# Patient Record
Sex: MALE | Race: WHITE | NOT HISPANIC OR LATINO | Employment: FULL TIME | URBAN - NONMETROPOLITAN AREA
[De-identification: names, ages, dates, MRNs, and addresses within clinical notes are randomized per-mention and may not be internally consistent; named-entity substitution may affect disease eponyms.]

---

## 2017-01-12 ENCOUNTER — OFFICE VISIT (OUTPATIENT)
Dept: UROLOGY | Facility: CLINIC | Age: 52
End: 2017-01-12

## 2017-01-12 VITALS
RESPIRATION RATE: 16 BRPM | WEIGHT: 180 LBS | SYSTOLIC BLOOD PRESSURE: 133 MMHG | OXYGEN SATURATION: 98 % | HEART RATE: 84 BPM | HEIGHT: 69 IN | DIASTOLIC BLOOD PRESSURE: 89 MMHG | BODY MASS INDEX: 26.66 KG/M2

## 2017-01-12 DIAGNOSIS — R35.1 NOCTURIA: Primary | ICD-10-CM

## 2017-01-12 DIAGNOSIS — E29.1 HYPOGONADISM MALE: ICD-10-CM

## 2017-01-12 DIAGNOSIS — N52.9 ERECTILE DYSFUNCTION, UNSPECIFIED ERECTILE DYSFUNCTION TYPE: ICD-10-CM

## 2017-01-12 DIAGNOSIS — N40.1 BPH (BENIGN PROSTATIC HYPERTROPHY) WITH URINARY OBSTRUCTION: ICD-10-CM

## 2017-01-12 DIAGNOSIS — R79.89 LOW TESTOSTERONE: Primary | ICD-10-CM

## 2017-01-12 DIAGNOSIS — N13.8 BPH (BENIGN PROSTATIC HYPERTROPHY) WITH URINARY OBSTRUCTION: ICD-10-CM

## 2017-01-12 DIAGNOSIS — N41.1 CHRONIC PROSTATITIS: ICD-10-CM

## 2017-01-12 LAB
BILIRUB BLD-MCNC: NEGATIVE MG/DL
CLARITY, POC: CLEAR
COLOR UR: YELLOW
GLUCOSE UR STRIP-MCNC: NEGATIVE MG/DL
KETONES UR QL: NEGATIVE
LEUKOCYTE EST, POC: NEGATIVE
LH SERPL-ACNC: 4.4 MIU/ML
NITRITE UR-MCNC: NEGATIVE MG/ML
PH UR: 6 [PH] (ref 5–8)
PROLACTIN SERPL-MCNC: 7.6 NG/ML
PROT UR STRIP-MCNC: NEGATIVE MG/DL
RBC # UR STRIP: NEGATIVE /UL
SP GR UR: 1.01 (ref 1–1.03)
UROBILINOGEN UR QL: NORMAL

## 2017-01-12 PROCEDURE — 83002 ASSAY OF GONADOTROPIN (LH): CPT | Performed by: UROLOGY

## 2017-01-12 PROCEDURE — 99204 OFFICE O/P NEW MOD 45 MIN: CPT | Performed by: UROLOGY

## 2017-01-12 PROCEDURE — 76857 US EXAM PELVIC LIMITED: CPT | Performed by: UROLOGY

## 2017-01-12 PROCEDURE — 84403 ASSAY OF TOTAL TESTOSTERONE: CPT | Performed by: UROLOGY

## 2017-01-12 PROCEDURE — 81003 URINALYSIS AUTO W/O SCOPE: CPT | Performed by: UROLOGY

## 2017-01-12 PROCEDURE — 84402 ASSAY OF FREE TESTOSTERONE: CPT | Performed by: UROLOGY

## 2017-01-12 PROCEDURE — 84146 ASSAY OF PROLACTIN: CPT | Performed by: UROLOGY

## 2017-01-12 PROCEDURE — 36415 COLL VENOUS BLD VENIPUNCTURE: CPT | Performed by: UROLOGY

## 2017-01-12 NOTE — PROGRESS NOTES
[facility]- UROLOGY  [address], [address]  [place], [place] [address]  [phone]      01/12/2017    Zachariahkayleen Dowd  1965        Pelvic Ultrasound with PVR    A transabdominal pelvic ultrasound was performed using a 3.5 MHz transducer of a B-K Gonsalez through the suprapubic area.     The purpose of the study was to investigate the patient's voiding symptoms.  There was mild bladder wall thickening noted.  There were no intravesical filling defects seen.  The post void residual of 46.9 ml was noted.  Prostate was heterogeneous with calcifications and was noted to be only 25.7 grams.  There was no  protrusion of the prostate into the bladder.  Ultrasound images will be scanned into the chart for reference.       CPT code 32593        Performed by Des Mckeon MD

## 2017-01-12 NOTE — MR AVS SNAPSHOT
Zachariah Dowd   1/12/2017 9:30 AM   Office Visit    Dept Phone:  [phone]   Encounter #:  [id number]    Provider:  Des Mckeon MD   Department:  [facility] UROLOGY                Your Full Care Plan              Your Updated Medication List          This list is accurate as of: 1/12/17 10:22 AM.  Always use your most recent med list.                doxycycline 100 MG tablet   Commonly known as:  VIBRAMYICN   Take 1 tablet by mouth 2 (Two) Times a Day.       famotidine 20 MG tablet   Commonly known as:  PEPCID   Take 1 tablet by mouth 2 (Two) Times a Day.       gabapentin 600 MG tablet   Commonly known as:  NEURONTIN       HYDROcodone-acetaminophen  MG per tablet   Commonly known as:  NORCO       lisinopril 10 MG tablet   Commonly known as:  PRINIVIL,ZESTRIL   Take 1 tablet by mouth Daily.       metroNIDAZOLE 500 MG tablet   Commonly known as:  FLAGYL   Take 1 tablet by mouth 3 (Three) Times a Day.       omeprazole 40 MG capsule   Commonly known as:  priLOSEC   Take 1 capsule by mouth Daily.       ondansetron 4 MG tablet   Commonly known as:  ZOFRAN   Take 1 tablet by mouth Every 6 (Six) Hours As Needed for nausea or vomiting.       sucralfate 1 G tablet   Commonly known as:  CARAFATE   Take 1 tablet by mouth 4 (Four) Times a Day.       tamsulosin 0.4 MG capsule 24 hr capsule   Commonly known as:  FLOMAX   Take 1 capsule by mouth Every Night.       tiZANidine 4 MG tablet   Commonly known as:  ZANAFLEX       traMADol 50 MG tablet   Commonly known as:  ULTRAM               We Performed the Following     POC Urinalysis Dipstick, Automated       You Were Diagnosed With        Codes Comments    Nocturia    -  Primary ICD-10-CM: R35.1  ICD-9-CM: 788.43       Instructions     None    Patient Instructions History      Upcoming Appointments     Visit Type Date Time Department    NEW PATIENT 1/12/2017  9:30 AM [facility] UROLOGY [place]    FOLLOW UP 2/21/2017 10:00 AM  "[facility] UROLOGY [place]      [facility] Signup     [facility] [employer] allows you to send messages to your doctor, view your test results, renew your prescriptions, schedule appointments, and more. To sign up, go to [employer] and click on the Sign Up Now link in the New User? box. Enter your [employer] Activation Code exactly as it appears below along with the last four digits of your Social Security Number and your Date of Birth () to complete the sign-up process. If you do not sign up before the expiration date, you must request a new code.    [employer] Activation Code: [email or web address]  Expires: 2017 10:22 AM    If you have questions, you can email [email or web address] or call [phone] to talk to our [employer] staff. Remember, [employer] is NOT to be used for urgent needs. For medical emergencies, dial 911.               Other Info from Your Visit           Your Appointments     2017 10:00 AM EST   Follow Up with Des Mckeon MD   [facility] (--)    [address] [address]   [phone]           Arrive 15 minutes prior to appointment.              Allergies     No Known Allergies      Reason for Visit     Benign Prostatic Hypertrophy  REF PATIENT FOR NOCTURIA AND BPH.    Nocturia           Vital Signs     Blood Pressure Pulse Respirations Height Weight Oxygen Saturation    133/89 84 16 69\" (175.3 cm) 180 lb (81.6 kg) 98%    Body Mass Index Smoking Status                26.58 kg/m2 Former Smoker          Problems and Diagnoses Noted     Urination, excessive at night    -  Primary      Results     POC Urinalysis Dipstick, Automated      Component Value Standard Range & Units    Color Yellow Yellow, Straw, Dark Yellow, Sherice    Clarity, UA Clear Clear    Glucose, UA Negative Negative, 1000 mg/dL (3+) mg/dL    Bilirubin Negative Negative    Ketones, UA Negative Negative    Specific Gravity  1.015 1.005 - 1.030    Blood, " UA Negative Negative    pH, Urine 6.0 5.0 - 8.0    Protein, POC Negative Negative mg/dL    Urobilinogen, UA Normal Normal    Leukocytes Negative Negative    Nitrite, UA Negative Negative

## 2017-01-12 NOTE — PROGRESS NOTES
Chief Complaint  Benign Prostatic Hypertrophy ( REF PATIENT FOR NOCTURIA AND BPH.) and Nocturia        HPI  Noemí is a 51 y.o. male who is referred for evaluation of his voiding difficulties that he states have been present since grade school.  He has always seemed to have a small capacity overactive bladder with frequency and urgency since that time.  He's had periods when he had large amounts of caffeinated beverages (as many as 8 Cokes per day) but these have been discontinued after his stomach problems.  He continues to drink four large glasses of caffeinated iced tea per day.he was recently started on Flomax and has not been able to tell any difference in his voiding.  He continues to have nocturia ×2-3 although this is associated with his caffeine ingestion and snoring which could indicate sleep apnea.    Perhaps a separate issue are symptoms of hypogonadism with decreased strength stamina libido and erectile function that are probably related to his ingestion of Lortab that he takes for pain from rheumatoid arthritis.    Vitals:    01/12/17 0941   BP: 133/89   Pulse: 84   Resp: 16   SpO2: 98%       Past Medical History  Past Medical History   Diagnosis Date   • Arthritis        Past Surgical History  Past Surgical History   Procedure Laterality Date   • Shoulder surgery         Medications  has a current medication list which includes the following prescription(s): doxycycline, famotidine, gabapentin, hydrocodone-acetaminophen, lisinopril, metronidazole, omeprazole, ondansetron, sucralfate, tamsulosin, tizanidine, and tramadol.      Allergies  No Known Allergies    Social History  Social History     Social History Narrative       Family History  He has no family history of bladder or kidney cancer  He has no family history of kidney stones      AUA Symptom Score:      Review of Systems  Review of Systems  Positive for fatigue frequency urgency back pain joint pain joint swelling but negative in all  other categories.  Physical Exam  Physical Exam   Constitutional: He is oriented to person, place, and time. He appears well-developed and well-nourished.   HENT:   Head: Normocephalic and atraumatic.   Neck: Normal range of motion.   Cardiovascular: Normal rate.    Pulmonary/Chest: Effort normal. No respiratory distress.   Abdominal: Soft. He exhibits no distension and no mass. There is no tenderness. There is no rebound and no guarding. No hernia. Hernia confirmed negative in the right inguinal area and confirmed negative in the left inguinal area.   Genitourinary: Rectum normal, prostate normal, testes normal and penis normal.   Musculoskeletal: Normal range of motion.   Lymphadenopathy:     He has no cervical adenopathy. No inguinal adenopathy noted on the right or left side.   Neurological: He is alert and oriented to person, place, and time.   Skin: Skin is warm and dry.   Psychiatric: He has a normal mood and affect. His behavior is normal.   Vitals reviewed.      Labs Recent and today in the office:  Results for orders placed or performed in visit on 01/12/17   POC Urinalysis Dipstick, Automated   Result Value Ref Range    Color Yellow Yellow, Straw, Dark Yellow, Sherice    Clarity, UA Clear Clear    Glucose, UA Negative Negative, 1000 mg/dL (3+) mg/dL    Bilirubin Negative Negative    Ketones, UA Negative Negative    Specific Gravity  1.015 1.005 - 1.030    Blood, UA Negative Negative    pH, Urine 6.0 5.0 - 8.0    Protein, POC Negative Negative mg/dL    Urobilinogen, UA Normal Normal    Leukocytes Negative Negative    Nitrite, UA Negative Negative         Assessment & Plan  I have reviewed his CT scan which reveals a benign appearing cyst in the right kidney8 thickened bladder wall and an enlarged prostate with a calcium deposit.    Pelvic ultrasound today reveals minimal postvoid residual at least on Flomax and a 25 g prostate with calcifications.    I suggested he discontinue the Flomax for now and we will  need to recheck his postvoid residual without the medication.  He is instructed to get off his caffeine completely and return to consider medication for overactive bladder at that point.    He is also asking for help with hypogonadism so a testosterone level is checked.  The downside of suppressing his own endogenous production is presented along with the risks and benefits.

## 2017-01-17 LAB
TESTOST FREE SERPL-MCNC: 5.5 PG/ML (ref 7.2–24)
TESTOST SERPL-MCNC: 386.1 NG/DL (ref 348–1197)

## 2017-01-18 DIAGNOSIS — R35.1 NOCTURIA: ICD-10-CM

## 2017-01-18 DIAGNOSIS — N40.0 PROSTATE HYPERTROPHY: ICD-10-CM

## 2017-01-18 RX ORDER — TAMSULOSIN HYDROCHLORIDE 0.4 MG/1
1 CAPSULE ORAL NIGHTLY
Qty: 30 CAPSULE | Refills: 0 | Status: SHIPPED | OUTPATIENT
Start: 2017-01-18 | End: 2018-03-16 | Stop reason: ALTCHOICE

## 2017-02-21 ENCOUNTER — OFFICE VISIT (OUTPATIENT)
Dept: UROLOGY | Facility: CLINIC | Age: 52
End: 2017-02-21

## 2017-02-21 VITALS
HEART RATE: 85 BPM | BODY MASS INDEX: 26.66 KG/M2 | SYSTOLIC BLOOD PRESSURE: 134 MMHG | DIASTOLIC BLOOD PRESSURE: 86 MMHG | OXYGEN SATURATION: 98 % | WEIGHT: 180 LBS | TEMPERATURE: 98.7 F | HEIGHT: 69 IN

## 2017-02-21 DIAGNOSIS — N32.81 OVERACTIVE BLADDER: ICD-10-CM

## 2017-02-21 DIAGNOSIS — R35.0 URINARY FREQUENCY: Primary | ICD-10-CM

## 2017-02-21 DIAGNOSIS — N52.9 ERECTILE DYSFUNCTION, UNSPECIFIED ERECTILE DYSFUNCTION TYPE: ICD-10-CM

## 2017-02-21 DIAGNOSIS — E29.1 HYPOGONADISM MALE: ICD-10-CM

## 2017-02-21 LAB
BILIRUB BLD-MCNC: NEGATIVE MG/DL
CLARITY, POC: CLEAR
COLOR UR: YELLOW
GLUCOSE UR STRIP-MCNC: NEGATIVE MG/DL
KETONES UR QL: NEGATIVE
LEUKOCYTE EST, POC: NEGATIVE
NITRITE UR-MCNC: NEGATIVE MG/ML
PH UR: 6 [PH] (ref 5–8)
PROT UR STRIP-MCNC: NEGATIVE MG/DL
RBC # UR STRIP: NEGATIVE /UL
SP GR UR: 1.02 (ref 1–1.03)
UROBILINOGEN UR QL: NORMAL

## 2017-02-21 PROCEDURE — 81003 URINALYSIS AUTO W/O SCOPE: CPT | Performed by: UROLOGY

## 2017-02-21 PROCEDURE — 99214 OFFICE O/P EST MOD 30 MIN: CPT | Performed by: UROLOGY

## 2017-02-21 NOTE — PROGRESS NOTES
Chief Complaint  Urinary Frequency (1 MONTH FUP) and Hypogonadism        HPI  Noemí is a 51 y.o. male who returns today for follow-up of multiple urological concerns.  He seems to have a small capacity overactive bladder that is caused frequency and urgency since grade school.  He originally was consuming large amounts of caffeine and is now down to 2 cups of coffee today with some improvement.  He was taking Flomax on his last visit but with minimal postvoid residual I instructed him to stop it and he states there is been no change in his voiding.  Makes sense since he saw no benefit from starting it.  He's informed about anticholinergic medications for additional relief but declines when he hears about the side effects.  He's instructed about bladder training to increase the bladder capacity.    A second concern or symptoms of hypogonadism with decreased strength stamina and erectile dysfunction and therefore testosterone level was obtained.  His total level was normal but the free level was depressed.  This is doubly associated with a Lortab he takes for chronic pain.  The benefits and risks of testosterone supplement including suppression of endogenous production are reviewed in detail with the patient.  I don't recommend supplement since his symptoms are not that severe but I have encouraged him to minimize the intake of Lortab which is probably causing the problem.    The third concern is erectile dysfunction which is probably more from his stressful lifestyle and atherosclerosis than anything.    Vitals:    02/21/17 1039   BP: 134/86   Pulse: 85   Temp: 98.7 °F (37.1 °C)   SpO2: 98%       Past Medical History  Past Medical History   Diagnosis Date   • Arthritis        Past Surgical History  Past Surgical History   Procedure Laterality Date   • Shoulder surgery         Medications  has a current medication list which includes the following prescription(s): doxycycline, famotidine, gabapentin,  hydrocodone-acetaminophen, lisinopril, metronidazole, omeprazole, ondansetron, sucralfate, tamsulosin, tizanidine, and tramadol.      Allergies  No Known Allergies    Social History  Social History     Social History Narrative       Family History  He has no family history of bladder or kidney cancer  He has no family history of kidney stones      AUA Symptom Score:      Review of Systems  Review of Systems   Constitutional: Negative.    Genitourinary: Positive for frequency and urgency.   Musculoskeletal: Positive for back pain.   All other systems reviewed and are negative.      Physical Exam  Physical Exam    Labs Recent and today in the office:  Results for orders placed or performed in visit on 02/21/17   POC Urinalysis Dipstick, Automated   Result Value Ref Range    Color Yellow Yellow, Straw, Dark Yellow, Sherice    Clarity, UA Clear Clear    Glucose, UA Negative Negative, 1000 mg/dL (3+) mg/dL    Bilirubin Negative Negative    Ketones, UA Negative Negative    Specific Gravity  1.025 1.005 - 1.030    Blood, UA Negative Negative    pH, Urine 6.0 5.0 - 8.0    Protein, POC Negative Negative mg/dL    Urobilinogen, UA Normal Normal    Leukocytes Negative Negative    Nitrite, UA Negative Negative         Assessment & Plan  We have elected to avoid additional medication at this point and he'll return on an annual basis.  I've encouraged him to eat a heart healthy diet which may reduce the progression of atherosclerosis.  He is given Viagra samples prescription and coupon for his erectile dysfunction and encouraged to try to reduce his stressful lifestyle.

## 2017-04-10 DIAGNOSIS — I10 BENIGN ESSENTIAL HYPERTENSION: ICD-10-CM

## 2017-04-10 RX ORDER — LISINOPRIL 10 MG/1
10 TABLET ORAL DAILY
Qty: 30 TABLET | Refills: 5 | Status: SHIPPED | OUTPATIENT
Start: 2017-04-10 | End: 2017-10-07 | Stop reason: SDUPTHER

## 2017-10-07 DIAGNOSIS — I10 BENIGN ESSENTIAL HYPERTENSION: ICD-10-CM

## 2017-10-09 RX ORDER — LISINOPRIL 10 MG/1
TABLET ORAL
Qty: 30 TABLET | Refills: 5 | Status: SHIPPED | OUTPATIENT
Start: 2017-10-09 | End: 2019-03-12

## 2018-03-16 ENCOUNTER — OFFICE VISIT (OUTPATIENT)
Dept: UROLOGY | Facility: CLINIC | Age: 53
End: 2018-03-16

## 2018-03-16 VITALS
WEIGHT: 180 LBS | HEART RATE: 67 BPM | HEIGHT: 69 IN | DIASTOLIC BLOOD PRESSURE: 80 MMHG | TEMPERATURE: 97.5 F | SYSTOLIC BLOOD PRESSURE: 118 MMHG | OXYGEN SATURATION: 97 % | BODY MASS INDEX: 26.66 KG/M2

## 2018-03-16 DIAGNOSIS — E29.1 HYPOGONADISM MALE: ICD-10-CM

## 2018-03-16 DIAGNOSIS — R35.0 URINARY FREQUENCY: Primary | ICD-10-CM

## 2018-03-16 DIAGNOSIS — N32.81 OVERACTIVE BLADDER: ICD-10-CM

## 2018-03-16 DIAGNOSIS — N52.01 ERECTILE DYSFUNCTION DUE TO ARTERIAL INSUFFICIENCY: ICD-10-CM

## 2018-03-16 LAB
BILIRUB BLD-MCNC: NEGATIVE MG/DL
CLARITY, POC: CLEAR
COLOR UR: YELLOW
GLUCOSE UR STRIP-MCNC: NEGATIVE MG/DL
KETONES UR QL: NEGATIVE
LEUKOCYTE EST, POC: NEGATIVE
NITRITE UR-MCNC: NEGATIVE MG/ML
PH UR: 7 [PH] (ref 5–8)
PROT UR STRIP-MCNC: NEGATIVE MG/DL
RBC # UR STRIP: NEGATIVE /UL
SP GR UR: 1.01 (ref 1–1.03)
UROBILINOGEN UR QL: ABNORMAL

## 2018-03-16 PROCEDURE — 99214 OFFICE O/P EST MOD 30 MIN: CPT | Performed by: UROLOGY

## 2018-03-16 PROCEDURE — 96372 THER/PROPH/DIAG INJ SC/IM: CPT | Performed by: UROLOGY

## 2018-03-16 PROCEDURE — 81003 URINALYSIS AUTO W/O SCOPE: CPT | Performed by: UROLOGY

## 2018-03-16 RX ORDER — TESTOSTERONE CYPIONATE 200 MG/ML
400 INJECTION, SOLUTION INTRAMUSCULAR
Status: SHIPPED | OUTPATIENT
Start: 2018-03-16

## 2018-03-16 RX ORDER — SILDENAFIL CITRATE 20 MG/1
60 TABLET ORAL DAILY PRN
Qty: 30 TABLET | Refills: 11 | Status: SHIPPED | OUTPATIENT
Start: 2018-03-16 | End: 2019-03-12

## 2018-03-16 RX ADMIN — TESTOSTERONE CYPIONATE 400 MG: 200 INJECTION, SOLUTION INTRAMUSCULAR at 13:47

## 2018-03-16 NOTE — PROGRESS NOTES
Chief Complaint  Urinary Urgency (yearly exam) and Hypogonadism        HPI  Noemí is a 52 y.o. male who returns today for follow-up with multiple urological concerns.  He has symptoms of hypogonadism with decreased strength stamina muscle mass and on previous evaluation was found to have a marginal total testosterone and a low free testosterone level.  Various options for treatment are presented in detail along with the risk and benefits.  Specifically the need for close follow-up and monitoring for toxicity.  I recommended he try this for short period of time and then reassess with blood work to decide about long-term administration.    Second previous concern was overactive bladder.  Since cutting back on the caffeine and strangely taking an iodine supplement his symptoms are improved.  He had briefly tried Flomax without benefit was noted to have minimal postvoid residual.  His voided urine today is clear.    A third concern is erectile dysfunction.    Vitals:    03/16/18 1103   BP: 118/80   Pulse: 67   Temp: 97.5 °F (36.4 °C)   SpO2: 97%       Past Medical History  Past Medical History:   Diagnosis Date   • Arthritis        Past Surgical History  Past Surgical History:   Procedure Laterality Date   • SHOULDER SURGERY         Medications  has a current medication list which includes the following prescription(s): hydrocodone-acetaminophen, lisinopril, tizanidine, tramadol, gabapentin, ondansetron, and tamsulosin.      Allergies  No Known Allergies    Social History  Social History     Social History Narrative   • No narrative on file       Family History  He has no family history of bladder or kidney cancer  He has no family history of kidney stones      AUA Symptom Score:      Review of Systems  Review of Systems   Constitutional: Positive for fatigue.   Genitourinary: Positive for frequency.   All other systems reviewed and are negative.      Physical Exam  Physical Exam    Labs Recent and today in the  office:  Results for orders placed or performed in visit on 03/16/18   POC Urinalysis Dipstick, Automated   Result Value Ref Range    Color Yellow Yellow, Straw, Dark Yellow, Sherice    Clarity, UA Clear Clear    Glucose, UA Negative Negative, 1000 mg/dL (3+) mg/dL    Bilirubin Negative Negative    Ketones, UA Negative Negative    Specific Gravity  1.015 1.005 - 1.030    Blood, UA Negative Negative    pH, Urine 7.0 5.0 - 8.0    Protein, POC Negative Negative mg/dL    Urobilinogen, UA 1 E.U./dL  (A) Normal    Leukocytes Negative Negative    Nitrite, UA Negative Negative         Assessment & Plan  1 hypogonadism: He'll be started on Depo-testosterone 4 trial.  In the return for reevaluation  He understands the need for close monitoring for toxicity and will return with blood work.    #2 overactive bladder: Currently his symptoms are improved    #3 erectile dysfunction: Generic sildenafil is prescribed.

## 2018-03-16 NOTE — PROGRESS NOTES
400mg depo testo given IM Left dorso gluteal, no site reaction.  Lot [id number]  Exp 01/2020  ndc 1840-6729-71

## 2018-04-06 ENCOUNTER — CLINICAL SUPPORT (OUTPATIENT)
Dept: UROLOGY | Facility: CLINIC | Age: 53
End: 2018-04-06

## 2018-04-06 DIAGNOSIS — R79.89 LOW TESTOSTERONE: ICD-10-CM

## 2018-04-06 DIAGNOSIS — E29.1 HYPOGONADISM IN MALE: ICD-10-CM

## 2018-04-06 PROCEDURE — 96372 THER/PROPH/DIAG INJ SC/IM: CPT | Performed by: UROLOGY

## 2018-04-06 RX ADMIN — TESTOSTERONE CYPIONATE 400 MG: 200 INJECTION, SOLUTION INTRAMUSCULAR at 08:47

## 2018-04-06 NOTE — PROGRESS NOTES
400mg depo testo given Im left dorso gluteal.  No site reaction.  Lot [id number]  Exp 01/2020  NDC 2671-2422-44

## 2018-04-30 ENCOUNTER — CLINICAL SUPPORT (OUTPATIENT)
Dept: UROLOGY | Facility: CLINIC | Age: 53
End: 2018-04-30

## 2018-04-30 DIAGNOSIS — R79.89 LOW TESTOSTERONE: ICD-10-CM

## 2018-04-30 PROCEDURE — 96372 THER/PROPH/DIAG INJ SC/IM: CPT | Performed by: UROLOGY

## 2018-04-30 RX ADMIN — TESTOSTERONE CYPIONATE 400 MG: 200 INJECTION, SOLUTION INTRAMUSCULAR at 08:49

## 2018-04-30 NOTE — PROGRESS NOTES
400mg depo testo given IM Right dorso gluteal, no site reaction.  Lot [id number]  Exp 01/2020  ndc 9406-3998-82

## 2018-05-09 ENCOUNTER — OFFICE VISIT (OUTPATIENT)
Dept: INTERNAL MEDICINE | Facility: CLINIC | Age: 53
End: 2018-05-09

## 2018-05-09 VITALS
WEIGHT: 181 LBS | HEART RATE: 85 BPM | BODY MASS INDEX: 26.81 KG/M2 | SYSTOLIC BLOOD PRESSURE: 130 MMHG | OXYGEN SATURATION: 98 % | TEMPERATURE: 98.4 F | HEIGHT: 69 IN | DIASTOLIC BLOOD PRESSURE: 82 MMHG

## 2018-05-09 DIAGNOSIS — J40 BRONCHITIS: Primary | ICD-10-CM

## 2018-05-09 PROCEDURE — 99213 OFFICE O/P EST LOW 20 MIN: CPT | Performed by: PHYSICIAN ASSISTANT

## 2018-05-09 RX ORDER — GUAIFENESIN 600 MG/1
1200 TABLET, EXTENDED RELEASE ORAL EVERY 12 HOURS SCHEDULED
Qty: 30 TABLET | Refills: 0 | Status: SHIPPED | OUTPATIENT
Start: 2018-05-09 | End: 2019-03-12

## 2018-05-09 RX ORDER — DOXYCYCLINE HYCLATE 100 MG/1
100 CAPSULE ORAL 2 TIMES DAILY
Qty: 20 CAPSULE | Refills: 0 | Status: SHIPPED | OUTPATIENT
Start: 2018-05-09 | End: 2019-03-12

## 2018-05-09 RX ORDER — DEXTROMETHORPHAN HYDROBROMIDE AND PROMETHAZINE HYDROCHLORIDE 15; 6.25 MG/5ML; MG/5ML
5 SYRUP ORAL NIGHTLY PRN
Qty: 100 ML | Refills: 0 | Status: SHIPPED | OUTPATIENT
Start: 2018-05-09 | End: 2019-03-12

## 2018-05-09 NOTE — PROGRESS NOTES
Subjective     Chief Complaint: cough    History of Present Illness     Truong Dowd is a 52 y.o. male presenting with complaints of low-grade fevers, cough, congestion, sinus pressure, chest tightness ×5 days.  Patient has been using Tylenol Cold and sinus at home without relief.  Feels like he is getting worse.  Concerned as he has history of pneumonia in the past.  Denies wheezing, chest pain, nausea, vomiting, diarrhea.    He also notes decreased hearing out of left side.    The following portions of the patient's history were reviewed and updated as appropriate: current medications, allergies, PMH.    Review of Systems   Constitutional: Positive for fatigue. Negative for appetite change, chills, fever and unexpected weight change.   HENT: Positive for congestion, hearing loss and sinus pressure. Negative for ear pain, nosebleeds, sore throat, tinnitus and trouble swallowing.    Eyes: Negative for photophobia, discharge and visual disturbance.   Respiratory: Positive for cough, chest tightness and shortness of breath. Negative for wheezing.    Cardiovascular: Negative for chest pain, palpitations and leg swelling.   Gastrointestinal: Negative for abdominal distention, abdominal pain, blood in stool, constipation, diarrhea, nausea and vomiting.   Endocrine: Negative for cold intolerance, heat intolerance, polydipsia, polyphagia and polyuria.   Genitourinary: Negative for difficulty urinating, dysuria, flank pain, frequency, hematuria and urgency.   Musculoskeletal: Negative.  Negative for arthralgias, back pain, joint swelling, myalgias, neck pain and neck stiffness.   Skin: Negative for color change, pallor, rash and wound.   Allergic/Immunologic: Negative for environmental allergies, food allergies and immunocompromised state.   Neurological: Positive for headaches. Negative for dizziness, weakness and numbness.   Hematological: Negative for adenopathy. Does not bruise/bleed easily.  "  Psychiatric/Behavioral: Negative for dysphoric mood, hallucinations, sleep disturbance and suicidal ideas. The patient is not nervous/anxious.        Objective     Vitals:    05/09/18 0812   BP: 130/82   Pulse: 85   Temp: 98.4 °F (36.9 °C)   SpO2: 98%   Weight: 82.1 kg (181 lb)   Height: 175.3 cm (69.02\")       Physical Exam   Constitutional: Fatigued-appearing  HENT:   Right Ear: Tympanic membrane and external ear normal.   Left Ear: Cerumen impaction  Nose: Nose normal.   Mouth/Throat: OP erythema.  Eyes: EOM are normal. Pupils are equal, round, and reactive to light.   Neck: Normal range of motion. Neck supple.   Cardiovascular: Normal rate, regular rhythm and normal heart sounds.    Pulmonary/Chest: Rhonchi noted bilaterally, improved with coughing.  Abdominal: Soft. Bowel sounds are normal.   Musculoskeletal: Normal range of motion.   Lymphadenopathy: No cervical adenopathy noted.   Neurological: Alert and oriented to person, place, and time.   Skin: Skin is warm and dry.   Psychiatric: Exhibits a normal mood and affect.     Assessment/Plan     Diagnoses and all orders for this visit:    Bronchitis  -     doxycycline (VIBRAMYCIN) 100 MG capsule; Take 1 capsule by mouth 2 (Two) Times a Day.  -     promethazine-dextromethorphan (PROMETHAZINE-DM) 6.25-15 MG/5ML syrup; Take 5 mL by mouth At Night As Needed for Cough.  -     guaiFENesin (MUCINEX) 600 MG 12 hr tablet; Take 2 tablets by mouth Every 12 (Twelve) Hours.    Push fluids, encouraged mucinex, tylenol prn headache.  Cough syrup prn night time use.  RTC with any worsening signs or symptoms.    María Cobb PA-C  05/09/2018         Please note that portions of this note were completed with a voice recognition program. Efforts were made to edit dictation, but occasionally words are mistranscribed.  "

## 2018-05-23 ENCOUNTER — CLINICAL SUPPORT (OUTPATIENT)
Dept: UROLOGY | Facility: CLINIC | Age: 53
End: 2018-05-23

## 2018-05-23 DIAGNOSIS — E29.1 TESTICULAR HYPOGONADISM: ICD-10-CM

## 2018-05-23 DIAGNOSIS — R79.89 LOW TESTOSTERONE: ICD-10-CM

## 2018-05-23 DIAGNOSIS — Z12.5 SCREENING PSA (PROSTATE SPECIFIC ANTIGEN): Primary | ICD-10-CM

## 2018-05-23 PROCEDURE — 96372 THER/PROPH/DIAG INJ SC/IM: CPT | Performed by: UROLOGY

## 2018-05-23 RX ADMIN — TESTOSTERONE CYPIONATE 400 MG: 200 INJECTION, SOLUTION INTRAMUSCULAR at 09:12

## 2018-05-23 NOTE — PROGRESS NOTES
400mg depo testo given IM left dorso gluteal, no site reaction.  Lot [id number]  Exp 03/2020  ndc [id number]

## 2018-05-24 LAB
BASOPHILS # BLD AUTO: 0.05 10*3/MM3 (ref 0–0.2)
BASOPHILS NFR BLD AUTO: 1.1 % (ref 0–2.5)
EOSINOPHIL # BLD AUTO: 0.23 10*3/MM3 (ref 0–0.7)
EOSINOPHIL NFR BLD AUTO: 4.9 % (ref 0–7)
ERYTHROCYTE [DISTWIDTH] IN BLOOD BY AUTOMATED COUNT: 12.4 % (ref 11.5–14.5)
ESTRADIOL SERPL-MCNC: 7 PG/ML (ref 7.6–42.6)
HCT VFR BLD AUTO: 43.4 % (ref 42–52)
HGB BLD-MCNC: 15.5 G/DL (ref 14–18)
IMM GRANULOCYTES # BLD: 0.02 10*3/MM3 (ref 0–0.06)
IMM GRANULOCYTES NFR BLD: 0.4 % (ref 0–0.6)
LYMPHOCYTES # BLD AUTO: 1.18 10*3/MM3 (ref 0.6–3.4)
LYMPHOCYTES NFR BLD AUTO: 25.3 % (ref 10–50)
MCH RBC QN AUTO: 30.3 PG (ref 27–31)
MCHC RBC AUTO-ENTMCNC: 35.7 G/DL (ref 30–37)
MCV RBC AUTO: 84.9 FL (ref 80–94)
MONOCYTES # BLD AUTO: 0.25 10*3/MM3 (ref 0–0.9)
MONOCYTES NFR BLD AUTO: 5.4 % (ref 0–12)
NEUTROPHILS # BLD AUTO: 2.94 10*3/MM3 (ref 2–6.9)
NEUTROPHILS NFR BLD AUTO: 62.9 % (ref 37–80)
NRBC BLD AUTO-RTO: 0 /100 WBC (ref 0–0)
PLATELET # BLD AUTO: 191 10*3/MM3 (ref 130–400)
PSA SERPL-MCNC: 0.3 NG/ML (ref 0.06–4)
RBC # BLD AUTO: 5.11 10*6/MM3 (ref 4.7–6.1)
TESTOST SERPL-MCNC: 76 NG/DL (ref 264–916)
WBC # BLD AUTO: 4.67 10*3/MM3 (ref 4.8–10.8)

## 2018-06-15 ENCOUNTER — OFFICE VISIT (OUTPATIENT)
Dept: UROLOGY | Facility: CLINIC | Age: 53
End: 2018-06-15

## 2018-06-15 VITALS
SYSTOLIC BLOOD PRESSURE: 140 MMHG | BODY MASS INDEX: 26.81 KG/M2 | OXYGEN SATURATION: 99 % | WEIGHT: 181 LBS | HEIGHT: 69 IN | DIASTOLIC BLOOD PRESSURE: 70 MMHG | TEMPERATURE: 97.1 F | HEART RATE: 88 BPM

## 2018-06-15 DIAGNOSIS — E29.1 HYPOGONADISM MALE: ICD-10-CM

## 2018-06-15 DIAGNOSIS — B02.9 HERPES ZOSTER WITHOUT COMPLICATION: ICD-10-CM

## 2018-06-15 DIAGNOSIS — R35.0 URINARY FREQUENCY: Primary | ICD-10-CM

## 2018-06-15 LAB
BILIRUB BLD-MCNC: NEGATIVE MG/DL
CLARITY, POC: CLEAR
COLOR UR: YELLOW
GLUCOSE UR STRIP-MCNC: NEGATIVE MG/DL
KETONES UR QL: NEGATIVE
LEUKOCYTE EST, POC: NEGATIVE
NITRITE UR-MCNC: NEGATIVE MG/ML
PH UR: 5 [PH] (ref 5–8)
PROT UR STRIP-MCNC: NEGATIVE MG/DL
RBC # UR STRIP: NEGATIVE /UL
SP GR UR: 1.03 (ref 1–1.03)
UROBILINOGEN UR QL: NORMAL

## 2018-06-15 PROCEDURE — 99214 OFFICE O/P EST MOD 30 MIN: CPT | Performed by: UROLOGY

## 2018-06-15 PROCEDURE — 81003 URINALYSIS AUTO W/O SCOPE: CPT | Performed by: UROLOGY

## 2018-06-15 NOTE — PROGRESS NOTES
Chief Complaint  Urinary Frequency and Flank Pain        HPI  Noemí is a 52 y.o. male who returns today for follow-up of hypogonadism after 3 months of supplement using Depo-testosterone 400 mg every 3 weeks through the office.  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  He can't definitely tell me he seen a clear benefit from supplement and therefore I suggested we suspend them.  Blood work indicates it is safe to continue if he so chooses and with a low testosterone he appears to have the need.    The current new complaint is left-sided flank pain in the general area of his kidney.  He had a CT scan revealing normal kidneys in 2016 and his urine today is clear.    Vitals:    06/15/18 0908   BP: 140/70   Pulse: 88   Temp: 97.1 °F (36.2 °C)   SpO2: 99%       Past Medical History  Past Medical History:   Diagnosis Date   • Arthritis        Past Surgical History  Past Surgical History:   Procedure Laterality Date   • SHOULDER SURGERY         Medications  has a current medication list which includes the following prescription(s): doxycycline, guaifenesin, hydrocodone-acetaminophen, lisinopril, promethazine-dextromethorphan, sildenafil, tizanidine, and tramadol, and the following Facility-Administered Medications: testosterone cypionate.      Allergies  No Known Allergies    Social History  Social History     Social History Narrative   • No narrative on file       Family History  He has no family history of bladder or kidney cancer  He has no family history of kidney stones      AUA Symptom Score:      Review of Systems  Review of Systems    Physical Exam  Physical Exam   Constitutional: He is oriented to person, place, and time. He appears well-developed and well-nourished.   HENT:   Head: Normocephalic and atraumatic.   Neck: Normal range of motion.   Pulmonary/Chest: Effort normal. No respiratory distress.   Abdominal: Soft. He exhibits no  distension and no mass. There is no tenderness. No hernia.   Musculoskeletal: Normal range of motion.   Lymphadenopathy:     He has no cervical adenopathy.   Neurological: He is alert and oriented to person, place, and time.   Skin: Skin is warm and dry. Rash noted. Rash is vesicular.        Psychiatric: He has a normal mood and affect. His behavior is normal.   Vitals reviewed.      Labs Recent and today in the office:  Results for orders placed or performed in visit on 06/15/18   POC Urinalysis Dipstick, Automated   Result Value Ref Range    Color Yellow Yellow, Straw, Dark Yellow, Sherice    Clarity, UA Clear Clear    Specific Gravity  1.030 1.005 - 1.030    pH, Urine 5.0 5.0 - 8.0    Leukocytes Negative Negative    Nitrite, UA Negative Negative    Protein, POC Negative Negative mg/dL    Glucose, UA Negative Negative, 1000 mg/dL (3+) mg/dL    Ketones, UA Negative Negative    Urobilinogen, UA Normal Normal    Bilirubin Negative Negative    Blood, UA Negative Negative         Assessment & Plan  #1 hypogonadism: I would not commence of his clinical response and so I suggested we discontinue the supplement.  He can come back in 3 months and we will revisit the issue after he can decide if the supplement was a benefit.    #2 herpetic eruption consistent with shingles explains his left-sided flank pain.

## 2018-09-21 ENCOUNTER — OFFICE VISIT (OUTPATIENT)
Dept: UROLOGY | Facility: CLINIC | Age: 53
End: 2018-09-21

## 2018-09-21 VITALS
SYSTOLIC BLOOD PRESSURE: 126 MMHG | HEIGHT: 69 IN | OXYGEN SATURATION: 96 % | DIASTOLIC BLOOD PRESSURE: 82 MMHG | HEART RATE: 68 BPM | WEIGHT: 181 LBS | BODY MASS INDEX: 26.81 KG/M2

## 2018-09-21 DIAGNOSIS — E29.1 HYPOGONADISM IN MALE: Primary | ICD-10-CM

## 2018-09-21 PROCEDURE — 81003 URINALYSIS AUTO W/O SCOPE: CPT | Performed by: UROLOGY

## 2018-09-21 PROCEDURE — 99214 OFFICE O/P EST MOD 30 MIN: CPT | Performed by: UROLOGY

## 2018-09-21 RX ORDER — TESTOSTERONE CYPIONATE 200 MG/ML
400 INJECTION, SOLUTION INTRAMUSCULAR
Status: SHIPPED | OUTPATIENT
Start: 2018-09-22 | End: 2023-11-25

## 2018-09-21 NOTE — PROGRESS NOTES
Chief Complaint  Hypogonadism (3 month fup with labs.)        ELVA Dowd is a 52 y.o. male who returns today for follow-up with a history of hypogonadism but we weren't sure if he was getting a good clinical boost from supplement so it was discontinued.  He states he was working at a hysterical pace at that time and things have slowed down a little benefit so he's anxious to resume the supplement.  We'll try one more time and he'll return in 3 months to decide if is worth the risk and cost of supplement.    Vitals:    09/21/18 1026   BP: 126/82   Pulse: 68   SpO2: 96%       Past Medical History  Past Medical History:   Diagnosis Date   • Arthritis        Past Surgical History  Past Surgical History:   Procedure Laterality Date   • SHOULDER SURGERY         Medications  has a current medication list which includes the following prescription(s): doxycycline, guaifenesin, hydrocodone-acetaminophen, lisinopril, promethazine-dextromethorphan, sildenafil, tizanidine, and tramadol, and the following Facility-Administered Medications: testosterone cypionate.      Allergies  No Known Allergies    Social History  Social History     Social History Narrative   • No narrative on file       Family History  He has no family history of bladder or kidney cancer  He has no family history of kidney stones      AUA Symptom Score:      Review of Systems  Review of Systems   Constitutional: Negative.    Genitourinary: Negative.    All other systems reviewed and are negative.      Physical Exam  Physical Exam    Labs Recent and today in the office:  Results for orders placed or performed in visit on 09/21/18   POC Urinalysis Dipstick, Automated   Result Value Ref Range    Color Yellow Yellow, Straw, Dark Yellow, Sherice    Clarity, UA Clear Clear    Specific Gravity  1.025 1.005 - 1.030    pH, Urine 6.0 5.0 - 8.0    Leukocytes Negative Negative    Nitrite, UA Negative Negative    Protein, POC Negative Negative mg/dL    Glucose, UA  Negative Negative, 1000 mg/dL (3+) mg/dL    Ketones, UA Negative Negative    Urobilinogen, UA Normal Normal    Bilirubin Negative Negative    Blood, UA Negative Negative         Assessment & Plan  #1 hypogonadism: He clearly has symptoms of decreased energy stamina and muscle strength associated with an abnormally low testosterone level.  I therefore suggested a trial of supplement and then return for reassessment before deciding on long-term replacement.  Recent blood work indicates the need and safety of administration at this point.  Various options for treatment including topical cream and pellets are presented and he wants to have the Depo-testosterone injections for now

## 2019-03-11 DIAGNOSIS — M25.552 ARTHRALGIA OF LEFT HIP: Primary | ICD-10-CM

## 2019-03-12 ENCOUNTER — OFFICE VISIT (OUTPATIENT)
Dept: ORTHOPEDIC SURGERY | Facility: CLINIC | Age: 54
End: 2019-03-12

## 2019-03-12 VITALS — WEIGHT: 195.9 LBS | RESPIRATION RATE: 18 BRPM | HEIGHT: 69 IN | BODY MASS INDEX: 29.02 KG/M2

## 2019-03-12 DIAGNOSIS — M70.62 GREATER TROCHANTERIC BURSITIS, LEFT: ICD-10-CM

## 2019-03-12 DIAGNOSIS — M25.552 ARTHRALGIA OF LEFT HIP: Primary | ICD-10-CM

## 2019-03-12 DIAGNOSIS — M77.11 RIGHT TENNIS ELBOW: ICD-10-CM

## 2019-03-12 DIAGNOSIS — M51.36 DDD (DEGENERATIVE DISC DISEASE), LUMBAR: ICD-10-CM

## 2019-03-12 DIAGNOSIS — M25.521 ARTHRALGIA OF RIGHT ELBOW: ICD-10-CM

## 2019-03-12 PROCEDURE — 20610 DRAIN/INJ JOINT/BURSA W/O US: CPT | Performed by: PHYSICIAN ASSISTANT

## 2019-03-12 PROCEDURE — 99203 OFFICE O/P NEW LOW 30 MIN: CPT | Performed by: PHYSICIAN ASSISTANT

## 2019-03-12 PROCEDURE — 20550 NJX 1 TENDON SHEATH/LIGAMENT: CPT | Performed by: PHYSICIAN ASSISTANT

## 2019-03-12 RX ORDER — METHYLPREDNISOLONE ACETATE 40 MG/ML
40 INJECTION, SUSPENSION INTRA-ARTICULAR; INTRALESIONAL; INTRAMUSCULAR; SOFT TISSUE
Status: COMPLETED | OUTPATIENT
Start: 2019-03-12 | End: 2019-03-12

## 2019-03-12 RX ORDER — LIDOCAINE HYDROCHLORIDE 10 MG/ML
2 INJECTION, SOLUTION INFILTRATION; PERINEURAL
Status: COMPLETED | OUTPATIENT
Start: 2019-03-12 | End: 2019-03-12

## 2019-03-12 RX ORDER — LIDOCAINE HYDROCHLORIDE 10 MG/ML
0.5 INJECTION, SOLUTION INFILTRATION; PERINEURAL
Status: COMPLETED | OUTPATIENT
Start: 2019-03-12 | End: 2019-03-12

## 2019-03-12 RX ORDER — TIZANIDINE 4 MG/1
TABLET ORAL
COMMUNITY

## 2019-03-12 RX ORDER — IBUPROFEN 200 MG
200 TABLET ORAL EVERY 8 HOURS PRN
COMMUNITY
End: 2019-06-13 | Stop reason: ALTCHOICE

## 2019-03-12 RX ORDER — TRIAMCINOLONE ACETONIDE 40 MG/ML
40 INJECTION, SUSPENSION INTRA-ARTICULAR; INTRAMUSCULAR
Status: COMPLETED | OUTPATIENT
Start: 2019-03-12 | End: 2019-03-12

## 2019-03-12 RX ADMIN — TRIAMCINOLONE ACETONIDE 40 MG: 40 INJECTION, SUSPENSION INTRA-ARTICULAR; INTRAMUSCULAR at 10:27

## 2019-03-12 RX ADMIN — LIDOCAINE HYDROCHLORIDE 0.5 ML: 10 INJECTION, SOLUTION INFILTRATION; PERINEURAL at 10:27

## 2019-03-12 RX ADMIN — METHYLPREDNISOLONE ACETATE 40 MG: 40 INJECTION, SUSPENSION INTRA-ARTICULAR; INTRALESIONAL; INTRAMUSCULAR; SOFT TISSUE at 10:24

## 2019-03-12 RX ADMIN — LIDOCAINE HYDROCHLORIDE 2 ML: 10 INJECTION, SOLUTION INFILTRATION; PERINEURAL at 10:24

## 2019-03-12 NOTE — PROGRESS NOTES
"Subjective   Patient ID: Truong Dowd is a 53 y.o. right hand dominant male  Pain of the Right Elbow (Tender to touch, difficulty with lifting and grasping, pain x several months. No known injury) and Pain of the Left Hip (Pain in hip on and off for a while, worse recently. No known injury)         History of Present Illness  Patient presents with left hip pain ongoing for the last several months without injury or trauma.  He has been taking ibuprofen with no improvement.  He denies numbness or tingling.  He states he does have chronic low back pain and has a personal history of L5-S1 degenerative disc disease    Patient would also like to have his right elbow evaluated stating over the last several months the pain has intensified to the lateral aspect.  He denies redness or warmth.  Denies injury or trauma to the elbow.  Denies numbness or tingling  Pain Score: 3  Pain Location: Hip(right elbow)  Pain Orientation: Left  Pain Radiating Towards: right elbow rad into forearm, no rad pain in hip  Pain Descriptors: (hip- deep ache, elbow- sharp)  Pain Frequency: (elbow-constant, hip- intermittent)  Pain Onset: Ongoing     Clinical Progression: Gradually worsening  Aggravating Factors: (elbow- gripping, grasping, lifting hip- \"feels like its not in the socket\")        Pain Intervention(s): (Giancarlo Emu on elbow, ibuprofen)  Result of Injury: No  Work-Related Injury: No    Past Medical History:   Diagnosis Date   • Arthritis         Past Surgical History:   Procedure Laterality Date   • SHOULDER SURGERY         Family History   Problem Relation Age of Onset   • Arthritis Other    • Hypertension Other        Social History     Socioeconomic History   • Marital status:      Spouse name: Not on file   • Number of children: Not on file   • Years of education: Not on file   • Highest education level: Not on file   Social Needs   • Financial resource strain: Not on file   • Food insecurity - worry: Not on file   • " "Food insecurity - inability: Not on file   • Transportation needs - medical: Not on file   • Transportation needs - non-medical: Not on file   Occupational History   • Occupation:      Employer: [employer]   • Occupation: farms cattle   Tobacco Use   • Smoking status: Former Smoker     Last attempt to quit:      Years since quittin.2   Substance and Sexual Activity   • Alcohol use: No   • Drug use: No   • Sexual activity: Not on file   Other Topics Concern   • Not on file   Social History Narrative    Right hand dominant         Current Outpatient Medications:   •  ibuprofen (ADVIL,MOTRIN) 200 MG tablet, Take 200 mg by mouth Every 8 (Eight) Hours As Needed for Mild Pain ., Disp: , Rfl:   •  tiZANidine (ZANAFLEX) 4 MG tablet, tizanidine 4 mg tablet, Disp: , Rfl:     Current Facility-Administered Medications:   •  Testosterone Cypionate (DEPOTESTOTERONE CYPIONATE) injection 400 mg, 400 mg, Intramuscular, Q21 Days, Des Mckeon MD, 400 mg at 18 0912  •  Testosterone Cypionate (DEPOTESTOTERONE CYPIONATE) injection 400 mg, 400 mg, Intramuscular, Q21 Days, Des Mckeon MD    No Known Allergies    Review of Systems   Constitutional: Negative for fever.   HENT: Negative for voice change.    Eyes: Negative for visual disturbance.   Respiratory: Negative for shortness of breath.    Cardiovascular: Negative for chest pain.   Gastrointestinal: Negative for abdominal distention and abdominal pain.   Genitourinary: Negative for dysuria.   Musculoskeletal: Positive for arthralgias. Negative for gait problem and joint swelling.        Joint stiffness   Skin: Negative for rash.   Neurological: Negative for speech difficulty.   Hematological: Does not bruise/bleed easily.   Psychiatric/Behavioral: Negative for confusion.       Objective   Resp 18   Ht 175.3 cm (69\")   Wt 88.9 kg (195 lb 14.4 oz)   BMI 28.93 kg/m²    Physical Exam   Constitutional: He is oriented to person, place, and " time. He appears well-nourished.   Pulmonary/Chest: Effort normal.   Musculoskeletal:        Right elbow: He exhibits normal range of motion, no swelling, no effusion and no deformity. Tenderness found. Lateral epicondyle tenderness noted.        Left hip: He exhibits tenderness (greater troch  bursa). He exhibits normal strength and no swelling.        Lumbar back: He exhibits tenderness and bony tenderness.        Right hand: Normal sensation noted. Normal strength noted.   Neurological: He is alert and oriented to person, place, and time.   Skin: Capillary refill takes less than 2 seconds.   Psychiatric: He has a normal mood and affect.   Vitals reviewed.    Right Elbow Exam     Tenderness   The patient is experiencing tenderness in the lateral epicondyle.     Range of Motion   Extension: normal   Flexion: normal   Pronation: normal   Supination: normal     Muscle Strength   The patient has normal right elbow strength.    Tests   Tinel's sign (cubital tunnel): negative    Other   Erythema: absent  Sensation: normal           Extremity DVT signs are Negative by clinical screen.   Neurologic Exam     Mental Status   Oriented to person, place, and time.              Assessment/Plan   Independent Review of Radiographic Studies:    Shows no acute fracture or dislocation.    X-ray of the right elbow reveals a small olecranon spur.  Large Joint Arthrocentesis: L greater trochanteric bursa  Date/Time: 3/12/2019 10:24 AM  Consent given by: patient  Site marked: site marked  Timeout: Immediately prior to procedure a time out was called to verify the correct patient, procedure, equipment, support staff and site/side marked as required   Supporting Documentation  Indications: pain   Procedure Details  Location: hip - L greater trochanteric bursa  Preparation: Patient was prepped and draped in the usual sterile fashion  Needle size: 22 G  Approach: lateral  Medications administered: 40 mg methylPREDNISolone acetate 40 MG/ML;  "2 mL lidocaine 1 %  Patient tolerance: patient tolerated the procedure well with no immediate complications    Injection Tendon or Ligament-right lateral epicondyle tendon  Date/Time: 3/12/2019 10:27 AM  Performed by: Dash Howard PA-C  Authorized by: Dash Howard PA-C   Consent: Verbal consent obtained.  Risks and benefits: risks, benefits and alternatives were discussed  Consent given by: patient  Patient understanding: patient states understanding of the procedure being performed  Patient consent: the patient's understanding of the procedure matches consent given  Procedure consent: procedure consent matches procedure scheduled  Site marked: the operative site was marked  Imaging studies: imaging studies available  Patient identity confirmed: verbally with patient  Time out: Immediately prior to procedure a \"time out\" was called to verify the correct patient, procedure, equipment, support staff and site/side marked as required.  Preparation: Patient was prepped and draped in the usual sterile fashion.  Local anesthesia used: yes    Anesthesia:  Local anesthesia used: yes  Local Anesthetic: lidocaine 1% without epinephrine  Anesthetic total: 0.25 mL    Sedation:  Patient sedated: no    Patient tolerance: Patient tolerated the procedure well with no immediate complications  Comments: 0.25 plain Lidocaine 1% used  0.25 Kenalog-40 used  Medications administered: 0.5 mL lidocaine 1 %; 40 mg triamcinolone acetonide 40 MG/ML             Truong was seen today for pain and pain.    Diagnoses and all orders for this visit:    Arthralgia of left hip  -     Large Joint Arthrocentesis: L greater trochanteric bursa    Arthralgia of right elbow  -     XR Elbow 3+ View Right  -     Injection Tendon or Ligament-right lateral epicondyle tendon    Right tennis elbow    Greater trochanteric bursitis, left    DDD (degenerative disc disease), lumbar    Other orders  -     Cancel: Large Joint Arthrocentesis  -     " Cancel: Small Joint Arthrocentesis  -     Cancel: Medium Joint Arthrocentesis       Orthopedic activities reviewed and patient expressed appreciation  Discussion of orthopedic goals  Risk, benefits, and merits of treatment alternatives reviewed with the patient and questions answered  Use brace as instructed  Call or notify for any adverse effect from injection therapy    Recommendations/Plan:  Exercise, medications, injections, other patient advice, and return appointment as noted.  Patient is encouraged to call or return for any issues or concerns.    Patient was given a right upper extremity counterforce brace.  Limit any lifting, pushing or pulling to the right upper extremity for at least 7 days  Patient agreeable to call or return sooner for any concerns.

## 2019-06-13 ENCOUNTER — OFFICE VISIT (OUTPATIENT)
Dept: ORTHOPEDIC SURGERY | Facility: CLINIC | Age: 54
End: 2019-06-13

## 2019-06-13 VITALS — HEIGHT: 69 IN | WEIGHT: 195 LBS | RESPIRATION RATE: 18 BRPM | BODY MASS INDEX: 28.88 KG/M2

## 2019-06-13 DIAGNOSIS — M77.11 RIGHT TENNIS ELBOW: ICD-10-CM

## 2019-06-13 DIAGNOSIS — M25.521 ARTHRALGIA OF RIGHT ELBOW: Primary | ICD-10-CM

## 2019-06-13 DIAGNOSIS — M51.36 DDD (DEGENERATIVE DISC DISEASE), LUMBAR: ICD-10-CM

## 2019-06-13 PROCEDURE — 20550 NJX 1 TENDON SHEATH/LIGAMENT: CPT | Performed by: PHYSICIAN ASSISTANT

## 2019-06-13 RX ORDER — TRIAMCINOLONE ACETONIDE 40 MG/ML
20 INJECTION, SUSPENSION INTRA-ARTICULAR; INTRAMUSCULAR
Status: COMPLETED | OUTPATIENT
Start: 2019-06-13 | End: 2019-06-13

## 2019-06-13 RX ORDER — LIDOCAINE HYDROCHLORIDE 10 MG/ML
0.5 INJECTION, SOLUTION INFILTRATION; PERINEURAL
Status: COMPLETED | OUTPATIENT
Start: 2019-06-13 | End: 2019-06-13

## 2019-06-13 RX ORDER — INDOMETHACIN 50 MG/1
50 CAPSULE ORAL
Qty: 15 CAPSULE | Refills: 0 | Status: SHIPPED | OUTPATIENT
Start: 2019-06-13

## 2019-06-13 RX ADMIN — TRIAMCINOLONE ACETONIDE 20 MG: 40 INJECTION, SUSPENSION INTRA-ARTICULAR; INTRAMUSCULAR at 09:55

## 2019-06-13 RX ADMIN — LIDOCAINE HYDROCHLORIDE 0.5 ML: 10 INJECTION, SOLUTION INFILTRATION; PERINEURAL at 09:55

## 2019-06-13 NOTE — PROGRESS NOTES
Subjective   Patient ID: Truong Dowd is a 53 y.o. right hand dominant male  Follow-up of the Left Hip (Injection 3/12/19. He states he didn't notice much of a change after injection. Pain is intermittent) and Follow-up of the Right Elbow (Injection on 3/12/19 helped for a few months. He states elbow if flared up again at this time. He wants to discuss if he will need to continue injections or if there another treatment option )         History of Present Illness  Patient is following up for scheduled exam in regards to right tennis elbow and left hip arthralgia.  He states the elbow injection provided relief despite having to do intense work using his upper extremities.  He did use the right elbow counterforce brace which helped.  He states the injection to the left greater trochanter bursa did not provide relief he is still having pain to the lower back and lateral hip area.  He denies any pronounced numbness or tingling.                                                   Past Medical History:   Diagnosis Date   • Arthritis         Past Surgical History:   Procedure Laterality Date   • SHOULDER SURGERY         Family History   Problem Relation Age of Onset   • Arthritis Other    • Hypertension Other        Social History     Socioeconomic History   • Marital status:      Spouse name: Not on file   • Number of children: Not on file   • Years of education: Not on file   • Highest education level: Not on file   Occupational History   • Occupation:      Employer: [employer]   • Occupation: [employer]   Tobacco Use   • Smoking status: Former Smoker     Last attempt to quit:      Years since quittin.4   Substance and Sexual Activity   • Alcohol use: No   • Drug use: No   Social History Narrative    Right hand dominant       I have reviewed all of the above social hx, family hx, surgical hx, medications, allergies & ROS and confirm that it is accurate.      Current Outpatient  "Medications:   •  tiZANidine (ZANAFLEX) 4 MG tablet, tizanidine 4 mg tablet, Disp: , Rfl:   •  indomethacin (INDOCIN) 50 MG capsule, Take 1 capsule by mouth 3 (Three) Times a Day With Meals., Disp: 15 capsule, Rfl: 0    Current Facility-Administered Medications:   •  Testosterone Cypionate (DEPOTESTOTERONE CYPIONATE) injection 400 mg, 400 mg, Intramuscular, Q21 Days, Des Mckeon MD, 400 mg at 05/23/18 0912  •  Testosterone Cypionate (DEPOTESTOTERONE CYPIONATE) injection 400 mg, 400 mg, Intramuscular, Q21 Days, Des Mckeon MD    No Known Allergies    Review of Systems   Constitutional: Negative for fever.   HENT: Negative for voice change.    Eyes: Negative for visual disturbance.   Respiratory: Negative for shortness of breath.    Cardiovascular: Negative for chest pain.   Gastrointestinal: Negative for abdominal pain.   Genitourinary: Negative for dysuria.   Musculoskeletal: Positive for arthralgias. Negative for gait problem and joint swelling.   Skin: Negative for rash.   Neurological: Negative for speech difficulty.   Hematological: Does not bruise/bleed easily.   Psychiatric/Behavioral: Negative for confusion.       Objective   Resp 18   Ht 175.3 cm (69\")   Wt 88.5 kg (195 lb)   BMI 28.80 kg/m²    Physical Exam   Constitutional: He appears well-developed.   Pulmonary/Chest: Effort normal.   Musculoskeletal:        Right elbow: He exhibits normal range of motion, no swelling, no effusion and no deformity. Tenderness found. Lateral epicondyle tenderness noted.        Left hip: He exhibits tenderness. He exhibits no swelling and no crepitus.        Right hand: He exhibits normal capillary refill and no swelling. Normal sensation noted. Normal strength noted.   Psychiatric: He has a normal mood and affect.   Vitals reviewed.    Left Hip Exam     Tenderness   The patient is experiencing tenderness in the posterior and lateral.    Range of Motion   Abduction: normal   Flexion: normal   External " rotation: normal   Internal rotation: normal     Tests   ROXANN: positive    Other   Sensation: normal      Back Exam     Tenderness   The patient is experiencing tenderness in the lumbar and sacroiliac.    Other   Gait: normal       Right Elbow Exam     Range of Motion   Extension: normal   Flexion: normal   Pronation: normal   Supination: normal     Muscle Strength   The patient has normal right elbow strength.    Tests   Tinel's sign (cubital tunnel): negative    Other   Erythema: absent  Sensation: normal           Extremity DVT signs are Negative by clinical screen.   Neurologic Exam         Assessment/Plan   Independent Review of Radiographic Studies:    No new imaging done today.      Medium Joint Arthrocentesis: R elbow  Date/Time: 6/13/2019 9:55 AM  Consent given by: patient  Site marked: site marked  Timeout: Immediately prior to procedure a time out was called to verify the correct patient, procedure, equipment, support staff and site/side marked as required   Supporting Documentation  Indications: pain   Procedure Details  Location: elbow - R elbow  Preparation: Patient was prepped and draped in the usual sterile fashion  Needle size: 25 G  Approach: anterolateral  Medications administered: 0.5 mL lidocaine 1 %; 20 mg triamcinolone acetonide 40 MG/ML  Patient tolerance: patient tolerated the procedure well with no immediate complications             Truong was seen today for follow-up and follow-up.    Diagnoses and all orders for this visit:    Arthralgia of right elbow  -     Medium Joint Arthrocentesis: R elbow    DDD (degenerative disc disease), lumbar    Right tennis elbow    Other orders  -     Cancel: Large Joint Arthrocentesis  -     Cancel: Injection Tendon or Ligament  -     indomethacin (INDOCIN) 50 MG capsule; Take 1 capsule by mouth 3 (Three) Times a Day With Meals.       Orthopedic activities reviewed and patient expressed appreciation  Discussion of orthopedic goals  Risk, benefits, and  merits of treatment alternatives reviewed with the patient and questions answered  Call or notify for any adverse effect from injection therapy    Recommendations/Plan:  Exercise, medications, injections, other patient advice, and return appointment as noted.  Patient is encouraged to call or return for any issues or concerns.    Patient politely declined formal physical therapy referral.  He was provided a physician guided home exercise program for spine conditioning.  He is advised to continue using the elbow counterforce brace and limit strenuous activity for the next 2 weeks after the injection.    Patient agreeable to call or return sooner for any concerns.           EMR Dragon-transcription disclaimer:  This encounter note is an electronic transcription of spoken language to printed text.  Electronic transcription of spoken language may permit erroneous or at times nonsensical words or phrases to be inadvertently transcribed.  Although I have reviewed the note for such errors, some may still exist

## 2020-07-07 DIAGNOSIS — M25.522 ARTHRALGIA OF LEFT ELBOW: Primary | ICD-10-CM
